# Patient Record
Sex: MALE | Race: WHITE
[De-identification: names, ages, dates, MRNs, and addresses within clinical notes are randomized per-mention and may not be internally consistent; named-entity substitution may affect disease eponyms.]

---

## 2017-04-05 ENCOUNTER — HOSPITAL ENCOUNTER (INPATIENT)
Dept: HOSPITAL 35 - ER | Age: 47
LOS: 1 days | Discharge: HOME | DRG: 917 | End: 2017-04-06
Attending: HOSPITALIST | Admitting: HOSPITALIST
Payer: COMMERCIAL

## 2017-04-05 VITALS
WEIGHT: 166 LBS | BODY MASS INDEX: 20.64 KG/M2 | SYSTOLIC BLOOD PRESSURE: 136 MMHG | HEIGHT: 75 IN | DIASTOLIC BLOOD PRESSURE: 95 MMHG

## 2017-04-05 VITALS — DIASTOLIC BLOOD PRESSURE: 47 MMHG | SYSTOLIC BLOOD PRESSURE: 77 MMHG

## 2017-04-05 VITALS — DIASTOLIC BLOOD PRESSURE: 63 MMHG | SYSTOLIC BLOOD PRESSURE: 96 MMHG

## 2017-04-05 VITALS — DIASTOLIC BLOOD PRESSURE: 86 MMHG | SYSTOLIC BLOOD PRESSURE: 128 MMHG

## 2017-04-05 DIAGNOSIS — R11.2: ICD-10-CM

## 2017-04-05 DIAGNOSIS — E87.1: ICD-10-CM

## 2017-04-05 DIAGNOSIS — F95.2: ICD-10-CM

## 2017-04-05 DIAGNOSIS — E87.6: ICD-10-CM

## 2017-04-05 DIAGNOSIS — F17.210: ICD-10-CM

## 2017-04-05 DIAGNOSIS — Z79.899: ICD-10-CM

## 2017-04-05 DIAGNOSIS — K85.90: ICD-10-CM

## 2017-04-05 DIAGNOSIS — F12.10: ICD-10-CM

## 2017-04-05 DIAGNOSIS — T40.7X1A: Primary | ICD-10-CM

## 2017-04-05 DIAGNOSIS — K58.9: ICD-10-CM

## 2017-04-05 LAB
ALBUMIN SERPL-MCNC: 3.9 G/DL (ref 3.4–5)
ALP SERPL-CCNC: 120 U/L (ref 46–116)
ALT SERPL-CCNC: 15 U/L (ref 30–65)
ANION GAP SERPL CALC-SCNC: 4 MMOL/L (ref 7–16)
AST SERPL-CCNC: 23 U/L (ref 15–37)
BASOPHILS NFR BLD AUTO: 0.5 % (ref 0–2)
BILIRUB SERPL-MCNC: 0.8 MG/DL
BUN SERPL-MCNC: 11 MG/DL (ref 7–18)
CALCIUM SERPL-MCNC: 9.3 MG/DL (ref 8.5–10.1)
CHLORIDE SERPL-SCNC: 85 MMOL/L (ref 98–107)
CO2 SERPL-SCNC: 35 MMOL/L (ref 21–32)
CREAT SERPL-MCNC: 1.3 MG/DL (ref 0.7–1.3)
EOSINOPHIL NFR BLD: 0.1 % (ref 0–3)
ERYTHROCYTE [DISTWIDTH] IN BLOOD BY AUTOMATED COUNT: 13 % (ref 10.5–14.5)
GLUCOSE SERPL-MCNC: 102 MG/DL (ref 74–106)
GRANULOCYTES NFR BLD MANUAL: 77.3 % (ref 36–66)
HCT VFR BLD CALC: 41.7 % (ref 42–52)
HGB BLD-MCNC: 14.3 GM/DL (ref 14–18)
LYMPHOCYTES NFR BLD AUTO: 12.1 % (ref 24–44)
MANUAL DIFFERENTIAL PERFORMED BLD QL: NO
MCH RBC QN AUTO: 29.5 PG (ref 26–34)
MCHC RBC AUTO-ENTMCNC: 34.2 G/DL (ref 28–37)
MCV RBC: 86.2 FL (ref 80–100)
MONOCYTES NFR BLD: 10 % (ref 1–8)
NEUTROPHILS # BLD: 7.7 THOU/UL (ref 1.4–8.2)
PLATELET # BLD: 242 THOU/UL (ref 150–400)
POTASSIUM SERPL-SCNC: 3.2 MMOL/L (ref 3.5–5.1)
PROT SERPL-MCNC: 7.2 G/DL (ref 6.4–8.2)
RBC # BLD AUTO: 4.83 MIL/UL (ref 4.5–6)
SODIUM SERPL-SCNC: 124 MMOL/L (ref 136–145)
WBC # BLD AUTO: 9.9 THOU/UL (ref 4–11)

## 2017-04-05 PROCEDURE — 10790: CPT

## 2017-04-06 VITALS — SYSTOLIC BLOOD PRESSURE: 92 MMHG | DIASTOLIC BLOOD PRESSURE: 51 MMHG

## 2017-04-06 VITALS — DIASTOLIC BLOOD PRESSURE: 69 MMHG | SYSTOLIC BLOOD PRESSURE: 124 MMHG

## 2017-04-06 VITALS — SYSTOLIC BLOOD PRESSURE: 124 MMHG | DIASTOLIC BLOOD PRESSURE: 69 MMHG

## 2017-04-06 VITALS — DIASTOLIC BLOOD PRESSURE: 67 MMHG | SYSTOLIC BLOOD PRESSURE: 114 MMHG

## 2017-04-06 LAB
ALBUMIN SERPL-MCNC: 3.5 G/DL (ref 3.4–5)
ALP SERPL-CCNC: 113 U/L (ref 46–116)
ALT SERPL-CCNC: 15 U/L (ref 30–65)
ANION GAP SERPL CALC-SCNC: 3 MMOL/L (ref 7–16)
AST SERPL-CCNC: 20 U/L (ref 15–37)
BILIRUB SERPL-MCNC: 0.8 MG/DL
BUN SERPL-MCNC: 11 MG/DL (ref 7–18)
CALCIUM SERPL-MCNC: 8.8 MG/DL (ref 8.5–10.1)
CHLORIDE SERPL-SCNC: 96 MMOL/L (ref 98–107)
CO2 SERPL-SCNC: 33 MMOL/L (ref 21–32)
CREAT SERPL-MCNC: 1.1 MG/DL (ref 0.7–1.3)
GLUCOSE SERPL-MCNC: 88 MG/DL (ref 74–106)
POTASSIUM SERPL-SCNC: 3 MMOL/L (ref 3.5–5.1)
PROT SERPL-MCNC: 6.6 G/DL (ref 6.4–8.2)
SODIUM SERPL-SCNC: 132 MMOL/L (ref 136–145)

## 2017-04-21 ENCOUNTER — HOSPITAL ENCOUNTER (EMERGENCY)
Dept: HOSPITAL 35 - ER | Age: 47
Discharge: HOME | End: 2017-04-21
Payer: COMMERCIAL

## 2017-04-21 VITALS — HEIGHT: 75 IN | BODY MASS INDEX: 21.14 KG/M2 | WEIGHT: 170 LBS

## 2017-04-21 DIAGNOSIS — K58.9: ICD-10-CM

## 2017-04-21 DIAGNOSIS — F42.9: ICD-10-CM

## 2017-04-21 DIAGNOSIS — F17.210: ICD-10-CM

## 2017-04-21 DIAGNOSIS — F12.10: ICD-10-CM

## 2017-04-21 DIAGNOSIS — F98.8: ICD-10-CM

## 2017-04-21 DIAGNOSIS — F95.2: ICD-10-CM

## 2017-04-21 DIAGNOSIS — F32.9: ICD-10-CM

## 2017-04-21 DIAGNOSIS — N17.9: Primary | ICD-10-CM

## 2017-04-21 LAB
ALBUMIN SERPL-MCNC: 4.2 G/DL (ref 3.4–5)
ALP SERPL-CCNC: 125 U/L (ref 46–116)
ALT SERPL-CCNC: 17 U/L (ref 30–65)
ANION GAP SERPL CALC-SCNC: 12 MMOL/L (ref 7–16)
AST SERPL-CCNC: 21 U/L (ref 15–37)
BASOPHILS NFR BLD AUTO: 0 % (ref 0–2)
BILIRUB SERPL-MCNC: 0.4 MG/DL
BUN SERPL-MCNC: 22 MG/DL (ref 7–18)
CALCIUM SERPL-MCNC: 9.6 MG/DL (ref 8.5–10.1)
CHLORIDE SERPL-SCNC: 100 MMOL/L (ref 98–107)
CO2 SERPL-SCNC: 27 MMOL/L (ref 21–32)
CREAT SERPL-MCNC: 2.2 MG/DL (ref 0.7–1.3)
EOSINOPHIL NFR BLD: 0 % (ref 0–3)
ERYTHROCYTE [DISTWIDTH] IN BLOOD BY AUTOMATED COUNT: 14.4 % (ref 10.5–14.5)
GLUCOSE SERPL-MCNC: 116 MG/DL (ref 74–106)
GRANULOCYTES NFR BLD MANUAL: 83 % (ref 36–66)
HCT VFR BLD CALC: 43.4 % (ref 42–52)
HGB BLD-MCNC: 14.8 GM/DL (ref 14–18)
LYMPHOCYTES NFR BLD AUTO: 9 % (ref 24–44)
MANUAL DIFFERENTIAL PERFORMED BLD QL: YES
MCH RBC QN AUTO: 29.9 PG (ref 26–34)
MCHC RBC AUTO-ENTMCNC: 34.1 G/DL (ref 28–37)
MCV RBC: 87.9 FL (ref 80–100)
MONOCYTES NFR BLD: 4 % (ref 1–8)
NEUTROPHILS # BLD: 9.7 THOU/UL (ref 1.4–8.2)
NEUTS BAND NFR BLD: 4 % (ref 0–8)
PLATELET # BLD EST: NORMAL 10*3/UL
PLATELET # BLD: 286 THOU/UL (ref 150–400)
POTASSIUM SERPL-SCNC: 4.6 MMOL/L (ref 3.5–5.1)
PROT SERPL-MCNC: 8.3 G/DL (ref 6.4–8.2)
RBC # BLD AUTO: 4.94 MIL/UL (ref 4.5–6)
RBC MORPH BLD: NORMAL
SODIUM SERPL-SCNC: 139 MMOL/L (ref 136–145)
TOTAL CELL COUNT: 100
TROPONIN I SERPL-MCNC: < 0.04 NG/ML
WBC # BLD AUTO: 11.1 THOU/UL (ref 4–11)

## 2018-10-25 ENCOUNTER — HOSPITAL ENCOUNTER (INPATIENT)
Dept: HOSPITAL 35 - ER | Age: 48
LOS: 2 days | Discharge: HOME | DRG: 103 | End: 2018-10-27
Attending: FAMILY MEDICINE | Admitting: INTERNAL MEDICINE
Payer: COMMERCIAL

## 2018-10-25 VITALS — DIASTOLIC BLOOD PRESSURE: 67 MMHG | SYSTOLIC BLOOD PRESSURE: 114 MMHG

## 2018-10-25 VITALS — BODY MASS INDEX: 21.82 KG/M2 | WEIGHT: 170 LBS | HEIGHT: 74.02 IN

## 2018-10-25 VITALS — SYSTOLIC BLOOD PRESSURE: 125 MMHG | DIASTOLIC BLOOD PRESSURE: 71 MMHG

## 2018-10-25 VITALS — DIASTOLIC BLOOD PRESSURE: 69 MMHG | SYSTOLIC BLOOD PRESSURE: 118 MMHG

## 2018-10-25 DIAGNOSIS — H92.09: ICD-10-CM

## 2018-10-25 DIAGNOSIS — E86.0: ICD-10-CM

## 2018-10-25 DIAGNOSIS — E87.1: ICD-10-CM

## 2018-10-25 DIAGNOSIS — T40.7X5A: ICD-10-CM

## 2018-10-25 DIAGNOSIS — Z90.49: ICD-10-CM

## 2018-10-25 DIAGNOSIS — K21.9: ICD-10-CM

## 2018-10-25 DIAGNOSIS — G43.A1: Primary | ICD-10-CM

## 2018-10-25 DIAGNOSIS — K29.00: ICD-10-CM

## 2018-10-25 DIAGNOSIS — F95.2: ICD-10-CM
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
## 2018-10-25 DIAGNOSIS — E87.6: ICD-10-CM

## 2018-10-25 DIAGNOSIS — Z79.899: ICD-10-CM

## 2018-10-25 DIAGNOSIS — K58.1: ICD-10-CM

## 2018-10-25 LAB
ALBUMIN SERPL-MCNC: 4.3 G/DL (ref 3.4–5)
ALT SERPL-CCNC: 16 U/L (ref 30–65)
ANION GAP SERPL CALC-SCNC: 8 MMOL/L (ref 7–16)
AST SERPL-CCNC: 27 U/L (ref 15–37)
BILIRUB SERPL-MCNC: 1.8 MG/DL
BILIRUB UR-MCNC: NEGATIVE MG/DL
BUN SERPL-MCNC: 17 MG/DL (ref 7–18)
CALCIUM SERPL-MCNC: 9.5 MG/DL (ref 8.5–10.1)
CHLORIDE SERPL-SCNC: 76 MMOL/L (ref 98–107)
CO2 SERPL-SCNC: 37 MMOL/L (ref 21–32)
COLOR UR: YELLOW
CREAT SERPL-MCNC: 1.3 MG/DL (ref 0.7–1.3)
ERYTHROCYTE [DISTWIDTH] IN BLOOD BY AUTOMATED COUNT: 12.8 % (ref 10.5–14.5)
GLUCOSE SERPL-MCNC: 85 MG/DL (ref 74–106)
GRANULOCYTES NFR BLD MANUAL: 75 % (ref 36–66)
HCT VFR BLD CALC: 43.7 % (ref 42–52)
HGB BLD-MCNC: 15 GM/DL (ref 14–18)
KETONES UR STRIP-MCNC: (no result) MG/DL
LIPASE: 173 U/L (ref 73–393)
LYMPHOCYTES NFR BLD AUTO: 14 % (ref 24–44)
MAGNESIUM SERPL-MCNC: 2.4 MG/DL (ref 1.8–2.4)
MCH RBC QN AUTO: 29.8 PG (ref 26–34)
MCHC RBC AUTO-ENTMCNC: 34.4 G/DL (ref 28–37)
MCV RBC: 86.7 FL (ref 80–100)
MONOCYTES NFR BLD: 9 % (ref 1–8)
NEUTROPHILS # BLD: 8.7 THOU/UL (ref 1.4–8.2)
PLATELET # BLD: 296 THOU/UL (ref 150–400)
POTASSIUM SERPL-SCNC: 2.2 MMOL/L (ref 3.5–5.1)
PROT SERPL-MCNC: 8.1 G/DL (ref 6.4–8.2)
RBC # BLD AUTO: 5.04 MIL/UL (ref 4.5–6)
RBC # UR STRIP: NEGATIVE /UL
SODIUM SERPL-SCNC: 121 MMOL/L (ref 136–145)
SP GR UR STRIP: <= 1.005 (ref 1–1.03)
TROPONIN I SERPL-MCNC: <0.06 NG/ML (ref ?–0.06)
URINE CLARITY: CLEAR
URINE GLUCOSE-RANDOM*: NEGATIVE
URINE LEUKOCYTES-REFLEX: NEGATIVE
URINE NITRITE-REFLEX: NEGATIVE
URINE PROTEIN (DIPSTICK): NEGATIVE
UROBILINOGEN UR STRIP-ACNC: 1 E.U./DL (ref 0.2–1)
VARIANT LYMPHS NFR BLD MANUAL: 2 %
WBC # BLD AUTO: 11.6 THOU/UL (ref 4–11)

## 2018-10-25 PROCEDURE — 10045: CPT

## 2018-10-26 VITALS — SYSTOLIC BLOOD PRESSURE: 103 MMHG | DIASTOLIC BLOOD PRESSURE: 65 MMHG

## 2018-10-26 VITALS — SYSTOLIC BLOOD PRESSURE: 116 MMHG | DIASTOLIC BLOOD PRESSURE: 63 MMHG

## 2018-10-26 VITALS — SYSTOLIC BLOOD PRESSURE: 107 MMHG | DIASTOLIC BLOOD PRESSURE: 69 MMHG

## 2018-10-26 LAB
ALBUMIN SERPL-MCNC: 3.3 G/DL (ref 3.4–5)
ALT SERPL-CCNC: 13 U/L (ref 30–65)
ANION GAP SERPL CALC-SCNC: 8 MMOL/L (ref 7–16)
AST SERPL-CCNC: 26 U/L (ref 15–37)
BILIRUB SERPL-MCNC: 1.4 MG/DL
BUN SERPL-MCNC: 13 MG/DL (ref 7–18)
CALCIUM SERPL-MCNC: 8.1 MG/DL (ref 8.5–10.1)
CHLORIDE SERPL-SCNC: 87 MMOL/L (ref 98–107)
CO2 SERPL-SCNC: 31 MMOL/L (ref 21–32)
CREAT SERPL-MCNC: 1.1 MG/DL (ref 0.7–1.3)
ERYTHROCYTE [DISTWIDTH] IN BLOOD BY AUTOMATED COUNT: 12.9 % (ref 10.5–14.5)
GLUCOSE SERPL-MCNC: 66 MG/DL (ref 74–106)
HCT VFR BLD CALC: 37.9 % (ref 42–52)
HGB BLD-MCNC: 12.9 GM/DL (ref 14–18)
MCH RBC QN AUTO: 30.4 PG (ref 26–34)
MCHC RBC AUTO-ENTMCNC: 34.1 G/DL (ref 28–37)
MCV RBC: 89 FL (ref 80–100)
PLATELET # BLD: 233 THOU/UL (ref 150–400)
POTASSIUM SERPL-SCNC: 2.8 MMOL/L (ref 3.5–5.1)
PROT SERPL-MCNC: 6.2 G/DL (ref 6.4–8.2)
RBC # BLD AUTO: 4.25 MIL/UL (ref 4.5–6)
SODIUM SERPL-SCNC: 126 MMOL/L (ref 136–145)
WBC # BLD AUTO: 8.2 THOU/UL (ref 4–11)

## 2018-10-27 VITALS — SYSTOLIC BLOOD PRESSURE: 105 MMHG | DIASTOLIC BLOOD PRESSURE: 71 MMHG

## 2018-10-27 VITALS — DIASTOLIC BLOOD PRESSURE: 72 MMHG | SYSTOLIC BLOOD PRESSURE: 115 MMHG

## 2018-10-27 VITALS — DIASTOLIC BLOOD PRESSURE: 71 MMHG | SYSTOLIC BLOOD PRESSURE: 105 MMHG

## 2018-10-27 LAB
ANION GAP SERPL CALC-SCNC: 7 MMOL/L (ref 7–16)
BUN SERPL-MCNC: 10 MG/DL (ref 7–18)
CALCIUM SERPL-MCNC: 8.5 MG/DL (ref 8.5–10.1)
CHLORIDE SERPL-SCNC: 94 MMOL/L (ref 98–107)
CO2 SERPL-SCNC: 28 MMOL/L (ref 21–32)
CREAT SERPL-MCNC: 1 MG/DL (ref 0.7–1.3)
GLUCOSE SERPL-MCNC: 81 MG/DL (ref 74–106)
POTASSIUM SERPL-SCNC: 3.3 MMOL/L (ref 3.5–5.1)
SODIUM SERPL-SCNC: 129 MMOL/L (ref 136–145)

## 2019-06-06 ENCOUNTER — HOSPITAL ENCOUNTER (INPATIENT)
Dept: HOSPITAL 35 - ER | Age: 49
LOS: 2 days | Discharge: HOME | DRG: 391 | End: 2019-06-08
Attending: INTERNAL MEDICINE | Admitting: INTERNAL MEDICINE
Payer: COMMERCIAL

## 2019-06-06 VITALS — SYSTOLIC BLOOD PRESSURE: 135 MMHG | DIASTOLIC BLOOD PRESSURE: 69 MMHG

## 2019-06-06 VITALS — HEIGHT: 74.02 IN | BODY MASS INDEX: 24.55 KG/M2 | WEIGHT: 191.3 LBS

## 2019-06-06 VITALS — DIASTOLIC BLOOD PRESSURE: 83 MMHG | SYSTOLIC BLOOD PRESSURE: 138 MMHG

## 2019-06-06 VITALS — SYSTOLIC BLOOD PRESSURE: 190 MMHG | DIASTOLIC BLOOD PRESSURE: 127 MMHG

## 2019-06-06 DIAGNOSIS — G93.41: ICD-10-CM

## 2019-06-06 DIAGNOSIS — F42.9: ICD-10-CM

## 2019-06-06 DIAGNOSIS — K21.9: ICD-10-CM

## 2019-06-06 DIAGNOSIS — E87.1: ICD-10-CM

## 2019-06-06 DIAGNOSIS — N17.9: ICD-10-CM

## 2019-06-06 DIAGNOSIS — E87.6: ICD-10-CM

## 2019-06-06 DIAGNOSIS — K58.9: ICD-10-CM

## 2019-06-06 DIAGNOSIS — F32.9: ICD-10-CM

## 2019-06-06 DIAGNOSIS — F98.8: ICD-10-CM

## 2019-06-06 DIAGNOSIS — J02.9: ICD-10-CM

## 2019-06-06 DIAGNOSIS — K29.70: Primary | ICD-10-CM

## 2019-06-06 DIAGNOSIS — E86.0: ICD-10-CM

## 2019-06-06 DIAGNOSIS — F17.210: ICD-10-CM

## 2019-06-06 LAB
ALBUMIN SERPL-MCNC: 4.6 G/DL (ref 3.4–5)
ALT SERPL-CCNC: 18 U/L (ref 30–65)
ANION GAP SERPL CALC-SCNC: 12 MMOL/L (ref 7–16)
AST SERPL-CCNC: 42 U/L (ref 15–37)
BASOPHILS NFR BLD AUTO: 0.3 % (ref 0–2)
BILIRUB SERPL-MCNC: 1.9 MG/DL
BILIRUB UR-MCNC: NEGATIVE MG/DL
BUN SERPL-MCNC: 72 MG/DL (ref 7–18)
CALCIUM SERPL-MCNC: 9.7 MG/DL (ref 8.5–10.1)
CHLORIDE SERPL-SCNC: 80 MMOL/L (ref 98–107)
CO2 SERPL-SCNC: 30 MMOL/L (ref 21–32)
COLOR UR: YELLOW
CREAT SERPL-MCNC: 2.6 MG/DL (ref 0.7–1.3)
EOSINOPHIL NFR BLD: 0 % (ref 0–3)
ERYTHROCYTE [DISTWIDTH] IN BLOOD BY AUTOMATED COUNT: 13.4 % (ref 10.5–14.5)
GLUCOSE SERPL-MCNC: 91 MG/DL (ref 74–106)
GRANULOCYTES NFR BLD MANUAL: 76.6 % (ref 36–66)
HCT VFR BLD CALC: 47.6 % (ref 42–52)
HGB BLD-MCNC: 16.3 GM/DL (ref 14–18)
KETONES UR STRIP-MCNC: (no result) MG/DL
LIPASE: 130 U/L (ref 73–393)
LYMPHOCYTES NFR BLD AUTO: 12.8 % (ref 24–44)
MCH RBC QN AUTO: 29.5 PG (ref 26–34)
MCHC RBC AUTO-ENTMCNC: 34.3 G/DL (ref 28–37)
MCV RBC: 86 FL (ref 80–100)
MONOCYTES NFR BLD: 10.3 % (ref 1–8)
NEUTROPHILS # BLD: 12.4 THOU/UL (ref 1.4–8.2)
PLATELET # BLD: 327 THOU/UL (ref 150–400)
POTASSIUM SERPL-SCNC: 3.2 MMOL/L (ref 3.5–5.1)
PROT SERPL-MCNC: 9.1 G/DL (ref 6.4–8.2)
RBC # BLD AUTO: 5.54 MIL/UL (ref 4.5–6)
RBC # UR STRIP: NEGATIVE /UL
SODIUM SERPL-SCNC: 122 MMOL/L (ref 136–145)
SP GR UR STRIP: 1.02 (ref 1–1.03)
URINE CLARITY: CLEAR
URINE GLUCOSE-RANDOM*: NEGATIVE
URINE LEUKOCYTES-REFLEX: NEGATIVE
URINE NITRITE-REFLEX: NEGATIVE
URINE PROTEIN (DIPSTICK): NEGATIVE
UROBILINOGEN UR STRIP-ACNC: 0.2 E.U./DL (ref 0.2–1)
WBC # BLD AUTO: 16.1 THOU/UL (ref 4–11)

## 2019-06-06 PROCEDURE — 10084: CPT

## 2019-06-07 VITALS — DIASTOLIC BLOOD PRESSURE: 77 MMHG | SYSTOLIC BLOOD PRESSURE: 132 MMHG

## 2019-06-07 VITALS — SYSTOLIC BLOOD PRESSURE: 105 MMHG | DIASTOLIC BLOOD PRESSURE: 46 MMHG

## 2019-06-07 VITALS — SYSTOLIC BLOOD PRESSURE: 110 MMHG | DIASTOLIC BLOOD PRESSURE: 54 MMHG

## 2019-06-07 VITALS — DIASTOLIC BLOOD PRESSURE: 57 MMHG | SYSTOLIC BLOOD PRESSURE: 103 MMHG

## 2019-06-07 LAB
ALBUMIN SERPL-MCNC: 3.3 G/DL (ref 3.4–5)
ALT SERPL-CCNC: 15 U/L (ref 30–65)
ANION GAP SERPL CALC-SCNC: 9 MMOL/L (ref 7–16)
AST SERPL-CCNC: 27 U/L (ref 15–37)
BASOPHILS NFR BLD AUTO: 0 % (ref 0–2)
BILIRUB SERPL-MCNC: 1.4 MG/DL
BUN SERPL-MCNC: 30 MG/DL (ref 7–18)
CALCIUM SERPL-MCNC: 8.2 MG/DL (ref 8.5–10.1)
CHLORIDE SERPL-SCNC: 92 MMOL/L (ref 98–107)
CO2 SERPL-SCNC: 29 MMOL/L (ref 21–32)
CREAT SERPL-MCNC: 1.2 MG/DL (ref 0.7–1.3)
EOSINOPHIL NFR BLD: 0 % (ref 0–3)
ERYTHROCYTE [DISTWIDTH] IN BLOOD BY AUTOMATED COUNT: 13.6 % (ref 10.5–14.5)
GLUCOSE SERPL-MCNC: 82 MG/DL (ref 74–106)
GRANULOCYTES NFR BLD MANUAL: 69 % (ref 36–66)
HCT VFR BLD CALC: 41.1 % (ref 42–52)
HGB BLD-MCNC: 13.8 GM/DL (ref 14–18)
LYMPHOCYTES NFR BLD AUTO: 16 % (ref 24–44)
MAGNESIUM SERPL-MCNC: 2.6 MG/DL (ref 1.8–2.4)
MCH RBC QN AUTO: 29.5 PG (ref 26–34)
MCHC RBC AUTO-ENTMCNC: 33.6 G/DL (ref 28–37)
MCV RBC: 87.8 FL (ref 80–100)
MONOCYTES NFR BLD: 15 % (ref 1–8)
NEUTROPHILS # BLD: 7.7 THOU/UL (ref 1.4–8.2)
PLATELET # BLD EST: NORMAL 10*3/UL
PLATELET # BLD: 278 THOU/UL (ref 150–400)
POTASSIUM SERPL-SCNC: 2.9 MMOL/L (ref 3.5–5.1)
PROT SERPL-MCNC: 6.7 G/DL (ref 6.4–8.2)
RBC # BLD AUTO: 4.68 MIL/UL (ref 4.5–6)
RBC MORPH BLD: NORMAL
SODIUM SERPL-SCNC: 130 MMOL/L (ref 136–145)
WBC # BLD AUTO: 11.1 THOU/UL (ref 4–11)

## 2019-06-07 NOTE — NUR
Met with patient who is A/Ox4. he admits with N/V. Patient independent with
adls pta. He is on disability, has tourettes, OCD, ADD. He lives with sister
and her boyfriend. He plans home independently. His PCP is Dr Kearney.
Patient reports feeling so much better today. Patient has completed DPOA
paperwork in past placed on chart. Anticipate home no needs. casemgt following

## 2019-06-07 NOTE — NUR
ASSUMED CARE AT 0900, SHIFT ASSESSMENT DONE, MEDS GIVEN,VSS. GI CONSULT TODAY.
NEW ORDER RECEIVED AND ADMINISTERED. REPORTS NAUSEA HAS GOTTEN BETTER, STILL
REPORTING HEARTBURN, TUMS, MYLANTA, KARAFE GIVEN AS PER eMAR. WILL CONTINUE
TO ASSESS AND ASSIST WITH ADLs AS NEEDED.

## 2019-06-07 NOTE — NUR
ASSUMED PT CARE 1900. PT ARRIVED FROM ED APPROX. 1800. ASSESSMENT COMPLETE.
VSS. ADMISSION EDUCATION PROVIDED. PT ALERT AND ORIENTED. PT DENIES PAIN.
REPORTS NAUSEA, SEE EMAR. PT HAS ICE CHIPS AT BEDSIDE. PT CALL LIGHT AND
PERSONAL BELONINGS WITHIN REACH. WILL CONTINUE POC UNTIL EOS.

## 2019-06-08 VITALS — DIASTOLIC BLOOD PRESSURE: 67 MMHG | SYSTOLIC BLOOD PRESSURE: 123 MMHG

## 2019-06-08 VITALS — DIASTOLIC BLOOD PRESSURE: 65 MMHG | SYSTOLIC BLOOD PRESSURE: 108 MMHG

## 2019-06-08 NOTE — NUR
Assumed pt care at 7am.Pt in bed resting without c/o but wanted to eat and
ambulate this am.Assessment completed.vss.Dr Tinsley here,dc order noted.
Pt ate breakfast and took all am meds.Dc summary compiled and reviewed with
pt.Saline lock dc'd.Pt left ambulatory with pca.Pt sister called to  pt
home med left behind.

## 2019-06-08 NOTE — NUR
ASSUMED PT CARE 1900. PT ALERT AND ORIENTED. REASSESSMENT COMPLETE. VSS. IV
DRESSING C/D/I, NO SIGNS OF INFITLRATION. PT REPORTS IMPROVEMENT FOR N/V.
REPORTS PAIN, SEE EMAR. UP AD THEE TO BATHROOM. CALL LIGHT AND PERSONAL
BELONINGS WITHIN REACH. WILL CONTINUE POC UNTIL EOS.

## 2019-10-29 ENCOUNTER — HOSPITAL ENCOUNTER (INPATIENT)
Dept: HOSPITAL 35 - ER | Age: 49
LOS: 2 days | Discharge: HOME | DRG: 394 | End: 2019-10-31
Payer: COMMERCIAL

## 2019-10-29 VITALS — SYSTOLIC BLOOD PRESSURE: 127 MMHG | DIASTOLIC BLOOD PRESSURE: 68 MMHG

## 2019-10-29 VITALS — DIASTOLIC BLOOD PRESSURE: 86 MMHG | SYSTOLIC BLOOD PRESSURE: 156 MMHG

## 2019-10-29 VITALS — HEIGHT: 75 IN | BODY MASS INDEX: 25.49 KG/M2 | WEIGHT: 205 LBS

## 2019-10-29 VITALS — SYSTOLIC BLOOD PRESSURE: 141 MMHG | DIASTOLIC BLOOD PRESSURE: 85 MMHG

## 2019-10-29 VITALS — SYSTOLIC BLOOD PRESSURE: 152 MMHG | DIASTOLIC BLOOD PRESSURE: 73 MMHG

## 2019-10-29 VITALS — DIASTOLIC BLOOD PRESSURE: 94 MMHG | SYSTOLIC BLOOD PRESSURE: 131 MMHG

## 2019-10-29 DIAGNOSIS — Z71.51: ICD-10-CM

## 2019-10-29 DIAGNOSIS — F12.10: ICD-10-CM

## 2019-10-29 DIAGNOSIS — E87.6: ICD-10-CM

## 2019-10-29 DIAGNOSIS — Z79.899: ICD-10-CM

## 2019-10-29 DIAGNOSIS — E87.5: ICD-10-CM

## 2019-10-29 DIAGNOSIS — F95.2: ICD-10-CM

## 2019-10-29 DIAGNOSIS — E87.8: ICD-10-CM

## 2019-10-29 DIAGNOSIS — F41.9: ICD-10-CM

## 2019-10-29 DIAGNOSIS — Z88.8: ICD-10-CM

## 2019-10-29 DIAGNOSIS — R11.15: Primary | ICD-10-CM

## 2019-10-29 DIAGNOSIS — F32.9: ICD-10-CM

## 2019-10-29 DIAGNOSIS — E87.1: ICD-10-CM

## 2019-10-29 LAB
ALBUMIN SERPL-MCNC: 4.1 G/DL (ref 3.4–5)
ALT SERPL-CCNC: 8 U/L (ref 30–65)
ANION GAP SERPL CALC-SCNC: 12 MMOL/L (ref 7–16)
AST SERPL-CCNC: 25 U/L (ref 15–37)
BASOPHILS NFR BLD AUTO: 0.3 % (ref 0–2)
BILIRUB SERPL-MCNC: 1 MG/DL
BUN SERPL-MCNC: 13 MG/DL (ref 7–18)
CALCIUM SERPL-MCNC: 8.7 MG/DL (ref 8.5–10.1)
CHLORIDE SERPL-SCNC: 87 MMOL/L (ref 98–107)
CO2 SERPL-SCNC: 27 MMOL/L (ref 21–32)
CREAT SERPL-MCNC: 1.3 MG/DL (ref 0.7–1.3)
EOSINOPHIL NFR BLD: 0.1 % (ref 0–3)
ERYTHROCYTE [DISTWIDTH] IN BLOOD BY AUTOMATED COUNT: 13 % (ref 10.5–14.5)
GLUCOSE SERPL-MCNC: 124 MG/DL (ref 74–106)
GRANULOCYTES NFR BLD MANUAL: 76.7 % (ref 36–66)
HCT VFR BLD CALC: 46.3 % (ref 42–52)
HGB BLD-MCNC: 15.6 GM/DL (ref 14–18)
LIPASE: 201 U/L (ref 73–393)
LYMPHOCYTES NFR BLD AUTO: 13.5 % (ref 24–44)
MCH RBC QN AUTO: 29.8 PG (ref 26–34)
MCHC RBC AUTO-ENTMCNC: 33.6 G/DL (ref 28–37)
MCV RBC: 88.7 FL (ref 80–100)
MONOCYTES NFR BLD: 9.4 % (ref 1–8)
NEUTROPHILS # BLD: 7.8 THOU/UL (ref 1.4–8.2)
PLATELET # BLD: 274 THOU/UL (ref 150–400)
POTASSIUM SERPL-SCNC: 2.7 MMOL/L (ref 3.5–5.1)
PROT SERPL-MCNC: 8.2 G/DL (ref 6.4–8.2)
RBC # BLD AUTO: 5.22 MIL/UL (ref 4.5–6)
SODIUM SERPL-SCNC: 126 MMOL/L (ref 136–145)
WBC # BLD AUTO: 10.2 THOU/UL (ref 4–11)

## 2019-10-29 PROCEDURE — 10879: CPT

## 2019-10-30 VITALS — SYSTOLIC BLOOD PRESSURE: 117 MMHG | DIASTOLIC BLOOD PRESSURE: 73 MMHG

## 2019-10-30 VITALS — DIASTOLIC BLOOD PRESSURE: 71 MMHG | SYSTOLIC BLOOD PRESSURE: 120 MMHG

## 2019-10-30 VITALS — SYSTOLIC BLOOD PRESSURE: 108 MMHG | DIASTOLIC BLOOD PRESSURE: 79 MMHG

## 2019-10-30 VITALS — SYSTOLIC BLOOD PRESSURE: 112 MMHG | DIASTOLIC BLOOD PRESSURE: 72 MMHG

## 2019-10-30 VITALS — SYSTOLIC BLOOD PRESSURE: 96 MMHG | DIASTOLIC BLOOD PRESSURE: 47 MMHG

## 2019-10-30 LAB
ANION GAP SERPL CALC-SCNC: 6 MMOL/L (ref 7–16)
ANION GAP SERPL CALC-SCNC: 8 MMOL/L (ref 7–16)
BUN SERPL-MCNC: 10 MG/DL (ref 7–18)
BUN SERPL-MCNC: 10 MG/DL (ref 7–18)
CALCIUM SERPL-MCNC: 8.6 MG/DL (ref 8.5–10.1)
CALCIUM SERPL-MCNC: 8.9 MG/DL (ref 8.5–10.1)
CHLORIDE SERPL-SCNC: 94 MMOL/L (ref 98–107)
CHLORIDE SERPL-SCNC: 95 MMOL/L (ref 98–107)
CO2 SERPL-SCNC: 31 MMOL/L (ref 21–32)
CO2 SERPL-SCNC: 32 MMOL/L (ref 21–32)
CREAT SERPL-MCNC: 0.9 MG/DL (ref 0.7–1.3)
CREAT SERPL-MCNC: 1.1 MG/DL (ref 0.7–1.3)
GLUCOSE SERPL-MCNC: 82 MG/DL (ref 74–106)
GLUCOSE SERPL-MCNC: 85 MG/DL (ref 74–106)
MAGNESIUM SERPL-MCNC: 2.4 MG/DL (ref 1.8–2.4)
POTASSIUM SERPL-SCNC: 2.8 MMOL/L (ref 3.5–5.1)
POTASSIUM SERPL-SCNC: 3.5 MMOL/L (ref 3.5–5.1)
SODIUM SERPL-SCNC: 132 MMOL/L (ref 136–145)
SODIUM SERPL-SCNC: 134 MMOL/L (ref 136–145)

## 2019-10-30 NOTE — NUR
ASSUMED CARE OF PT DURING ADMISSION TO UNIT. A&OX4. VS STABLE. SR ON TELE,
AFEBRILE. SEVERAL COMPLAINTS. COMPLAINED OF NAUSEA, VOMITING, BEING TIRED FROM
NOT SLEEPING D/T STRESS AT HOME, THE BED BEING TOO SHORT, NOT GETTING FLUIDS
THROUGH IV, ETC. NAUSEA MEDICINE GIVEN, NEW BED W/ EXTENDER ORDERED, QUIET
ENVIRONMENT PROVIDED. PARTIAL RELIEF TO SOME SIGNS/SYMPTOMS. VOMITED APPROX
600mL CLEAR EMESIS FROM DRINKING WATER AND GAGGING HIMSELF WITH HIS FINGERS
AND TOOTHBRUSH. EDUCATION PROVIDED, ENCOURAGED TO ABSTAIN FROM SELF-INDUCRF
VOMITING. WAS ABLE TO SLEEP FOR 4 HOURS. CURRENTLY RESTING. PROGRESSING
TOWARDS POC GOALS.

## 2019-10-30 NOTE — EKG
[address] [employer] [place]  [address]
Phone:  [phone]                    ELECTROCARDIOGRAM REPORT      
_______________________________________________________________________________
 
Name:       PÉREZ SHIRLEY                 Room #:         364-P       ADM IN  
M.R.#:      [id number]     Account #:      [id number]  
Admission:  10/29/19    Attend Phys:    Lawrence Brar MD
Discharge:              Date of Birth:  [birth year]  
                                                          Report #: [id number]
   [id number]
_______________________________________________________________________________
THIS REPORT FOR:   //name//                          
 
                         [facility] ED
                                       
Test Date:    2019-10-29               Test Time:    18:49:36
Pat Name:     PÉREZ SHIRLEY              Department:   
Patient ID:   [id number]            Room:         364
Gender:       M                        Technician:   MANUEL
:          1970               Requested By: Daniel Morrison
Order Number: [id number] MD:   Timi Chow
                                 Measurements
Intervals                              Axis          
Rate:         66                       P:            -5
KS:           214                      QRS:          -17
QRSD:         106                      T:            66
QT:           559                                    
QTc:          586                                    
                           Interpretive Statements
Sinus rhythm
Prolonged KS interval
Borderline left axis deviation
ST elevation suggests acute pericarditis
Prolonged QT interval
Compared to ECG 10/25/2018 15:57:26
 
Electronically Signed On 10- 8:07:56 CDT by Timi Chow
[email or web address]
 
 
 
 
 
 
 
 
 
 
 
 
 
 
 
 
  <ELECTRONICALLY SIGNED>
   By: Timi Chow MD        
  10/30/19     0807
D: 10/29/19 1849                           _____________________________________
T: 10/29/19 1849                           Timi Chow MD          /NENA

## 2019-10-30 NOTE — NUR
Patient had pain issues during K+ administration day shift.
. RN slowed the administration
rate and ran NS concurently. Patient was able to continue the administration.

## 2019-10-30 NOTE — NUR
INITIAL ASSESSMENT:
Received high risk nursing referral. SW reviewed chart and spoke with nursing
and attending physician. Pt was admitted from home due to
hyperemesis/hyponatremia. Pt is currently NPO due to nausea/vomiting. Pt with
hx of Tourettes Syndrome and THC use. SW met with pt at bedside. Introduced
role of SW. Pt is alert/orientated x 4. Pt reports he lives at home with his
sister and her family. Prior to admission, pt was independent with ADLs. No hx
of  services or post-acute placement. Pt's PCP is Dr. Sai Castillo.
Plan is for pt to discharge homee when medically stable. Pt states he may need
transportation home. Pt's sister works during the day. SW is following to
assist as needed with discharge planning.

## 2019-10-31 VITALS — SYSTOLIC BLOOD PRESSURE: 125 MMHG | DIASTOLIC BLOOD PRESSURE: 70 MMHG

## 2019-10-31 VITALS — SYSTOLIC BLOOD PRESSURE: 115 MMHG | DIASTOLIC BLOOD PRESSURE: 63 MMHG

## 2019-10-31 VITALS — DIASTOLIC BLOOD PRESSURE: 63 MMHG | SYSTOLIC BLOOD PRESSURE: 115 MMHG

## 2019-10-31 LAB
ANION GAP SERPL CALC-SCNC: 10 MMOL/L (ref 7–16)
BUN SERPL-MCNC: 12 MG/DL (ref 7–18)
CALCIUM SERPL-MCNC: 8.4 MG/DL (ref 8.5–10.1)
CHLORIDE SERPL-SCNC: 99 MMOL/L (ref 98–107)
CO2 SERPL-SCNC: 25 MMOL/L (ref 21–32)
CREAT SERPL-MCNC: 1.1 MG/DL (ref 0.7–1.3)
ERYTHROCYTE [DISTWIDTH] IN BLOOD BY AUTOMATED COUNT: 13.2 % (ref 10.5–14.5)
GLUCOSE SERPL-MCNC: 69 MG/DL (ref 74–106)
HCT VFR BLD CALC: 37.9 % (ref 42–52)
HGB BLD-MCNC: 12.5 GM/DL (ref 14–18)
MAGNESIUM SERPL-MCNC: 2.1 MG/DL (ref 1.8–2.4)
MCH RBC QN AUTO: 29.6 PG (ref 26–34)
MCHC RBC AUTO-ENTMCNC: 33.1 G/DL (ref 28–37)
MCV RBC: 89.5 FL (ref 80–100)
PLATELET # BLD: 259 THOU/UL (ref 150–400)
POTASSIUM SERPL-SCNC: 4.5 MMOL/L (ref 3.5–5.1)
RBC # BLD AUTO: 4.23 MIL/UL (ref 4.5–6)
SODIUM SERPL-SCNC: 134 MMOL/L (ref 136–145)
WBC # BLD AUTO: 6.8 THOU/UL (ref 4–11)

## 2019-10-31 NOTE — NUR
DISCHARGE NOTE:
MABEL reviewed chart and spoke with nursing and attending physician. Pt to have
lunch and if pt tolerates food, he will discharge home later today. MABEL met
with pt at bedside to discuss discharge plan. Pt is agreeable with plan. Pt
may need transportation home if his family is unable to come get him. SW
verified pt's home address. Pt to notify his nurse if he needs transportation
home. SW will provide cab voucher. MABEL is following to finalize discharge.

## 2019-10-31 NOTE — NUR
PT is A&OX3, pt is cooperative, pt starts eating at 1030am, and pt has a good
lunch, pt denies N/V and pain, pt's vs are stable, pt was going home at
1540pm . RN has giving D/C teaching, pt understands well.